# Patient Record
Sex: FEMALE | Race: OTHER
[De-identification: names, ages, dates, MRNs, and addresses within clinical notes are randomized per-mention and may not be internally consistent; named-entity substitution may affect disease eponyms.]

---

## 2017-05-03 ENCOUNTER — HOSPITAL ENCOUNTER (EMERGENCY)
Dept: HOSPITAL 31 - C.ER | Age: 22
Discharge: HOME | End: 2017-05-03
Payer: COMMERCIAL

## 2017-05-03 VITALS
SYSTOLIC BLOOD PRESSURE: 128 MMHG | TEMPERATURE: 98 F | RESPIRATION RATE: 20 BRPM | DIASTOLIC BLOOD PRESSURE: 72 MMHG | HEART RATE: 72 BPM

## 2017-05-03 VITALS — OXYGEN SATURATION: 99 %

## 2017-05-03 DIAGNOSIS — O20.0: Primary | ICD-10-CM

## 2017-05-03 DIAGNOSIS — O23.41: ICD-10-CM

## 2017-05-03 DIAGNOSIS — Z3A.00: ICD-10-CM

## 2017-05-03 LAB
BACTERIA #/AREA URNS HPF: (no result) /[HPF]
BILIRUB UR-MCNC: NEGATIVE MG/DL
GLUCOSE UR STRIP-MCNC: NORMAL MG/DL
KETONES UR STRIP-MCNC: NEGATIVE MG/DL
LEUKOCYTE ESTERASE UR-ACNC: (no result) LEU/UL
PH UR STRIP: 5 [PH] (ref 5–8)
PROT UR STRIP-MCNC: NEGATIVE MG/DL
RBC # UR STRIP: NEGATIVE /UL
RBC #/AREA URNS HPF: 4 /HPF (ref 0–3)
SP GR UR STRIP: 1.03 (ref 1–1.03)
UROBILINOGEN UR-MCNC: NORMAL MG/DL (ref 0.2–1)
WBC #/AREA URNS HPF: 10 /HPF (ref 0–5)

## 2017-05-03 NOTE — C.PDOC
History Of Present Illness





22 y/o female presents to the ED with complains of vaginal spotting yesterday 

and lower abdominal cramping today. Pt pregnant, LMP sometime in April, , 

 at age 16. Denies fever, chills, vomiting, diarrhea or any other 

complaints. 


Time Seen by Provider: 17 18:00


Chief Complaint (Nursing): Female Genitourinary


History Per: Patient


History/Exam Limitations: no limitations


Onset/Duration Of Symptoms: Hrs


Current Symptoms Are (Timing): Still Present


Severity: Mild


Recent travel outside of the [place]: No





Past Medical History


Reviewed: Historical Data, Nursing Documentation, Vital Signs


Vital Signs: 


 Last Vital Signs











Temp  98 F   17 20:


 


Pulse  72   17 20:26


 


Resp  20   17 20:26


 


BP  128/72   17 20:26


 


Pulse Ox  98   17 20:26











Family History: States: Unknown Family Hx





- Social History


Hx Alcohol Use: No


Hx Substance Use: No





Review Of Systems


Except As Marked, All Systems Reviewed And Found Negative.


Constitutional: Negative for: Fever, Chills


Gastrointestinal: Positive for: Abdominal Pain.  Negative for: Nausea, Vomiting


Genitourinary: Positive for: Vaginal Bleeding (spotting)





Physical Exam





- Physical Exam


Appears: Non-toxic, No Acute Distress


Skin: Warm, Dry, No Rash


Head: Atraumatic, Normacephalic


Neck: Normal ROM, Supple


Chest: Symmetrical


Cardiovascular: Rhythm Regular, No Murmur


Respiratory: Normal Breath Sounds, No Rales, No Rhonchi, No Wheezing


Gastrointestinal/Abdominal: Normal Exam, Soft, No Tenderness


Extremity: Bilateral: Atraumatic


Neurological/Psych: Oriented x3, Normal Speech





ED Course And Treatment





- Laboratory Results


Lab Interpretation: Abnormal (BHCG 46.35, Urine with 10 WBC, small bacteria and 

1+leukocyte esterase)


O2 Sat by Pulse Oximetry: 99 (room air)


Pulse Ox Interpretation: Normal





- CT Scan/US


  ** Pelvic US


Other Rad Studies (CT/US): Read By Radiologist, Radiology Report Reviewed


CT/US Interpretation: IMPRESSION:  No intrauterine pregnancy. In light of 

positive hCG, consider serial hCG follow-up and repeat.  ultrasound in one or 2 

weeks for more definitive assessment. 2.5 cm right ovarian cyst.


Progress Note: Plan: UA, beta-HCG, Pelvic US


Reevaluation Time: 20:18


Reassessment Condition: Unchanged (Patient in no distress.)





Disposition


Counseled Patient/Family Regarding: Studies Performed, Diagnosis, Need For 

Followup





- Disposition


Disposition: HOME/ ROUTINE


Disposition Time: 20:20


Condition: STABLE


Additional Instructions: 


Follow up with your gynecologist next week for repeat labs and ultrasound. Take 

the antibiotics they gave you for the urinary tract infection as prescribed. 


Instructions:  Threatened Miscarriage (ED), Urinary Tract Infection in 

Pregnancy (ED)





- Clinical Impression


Clinical Impression: 


 Threatened , UTI (urinary tract infection) in pregnancy in first 

trimester








- Scribe Statement


The provider has reviewed the documentation as recorded by the Gila Turpin


Provider Attestation: 








All medical record entries made by the Gila were at my direction and 

personally dictated by me. I have reviewed the chart and agree that the record 

accurately reflects my personal performance of the history, physical exam, 

medical decision making, and the department course for this patient. I have 

also personally directed, reviewed, and agree with the discharge instructions 

and disposition.

## 2017-05-04 NOTE — US
Pelvic ultrasound 



History: Pregnancy.  Vaginal bleeding. 



Comparison: None available. 



Technique: Real-time sonography was performed through the pelvis 

utilizing transabdominal and transvaginal techniques. 



Findings: 



Uterus: 7.0 x 3.9 x 4.5 centimeters.  Anteverted.  Heterogeneous 

echotexture. 



Endometrium: Thickened measuring up to 1.35 centimeters. 



No discrete intrauterine pregnancy identified. 



Right ovary: 4.5 x 2.4 x 3.6 centimeters. Normal flow. Multiple 

hypoechoic cysts the largest of which measures up to 2.5 centimeters. 



Left ovary: 4.1 x 2.8 x 3.9 centimeters. Normal flow. 



Impression: 



No discrete intrauterine pregnancy identified. In light of the 

positive HCG, HCG follow-up and repeat ultrasound in a 1 or 2 week 

interval for more definitive assessment may be helpful. In the 

setting of a positive pregnancy test and lack of discrete 

intrauterine pregnancy, these findings may include an early 

intrauterine pregnancy versus missed pregnancy versus ectopic 

pregnancy.  Clinical correlation. 



2.5 centimeter right ovarian cyst. 



Thickened endometrium measuring up to 1.35 centimeters. 



These findings were preliminarily reported by Dr Redd Martinez from 

[facility] at 7:55 p.m. on 05/03/2017. 



Limited 1st trimester ultrasound for viability purposes only.  

Continued interval followup with serial ultrasound, serial HCG levels,

 and gynecological consultation would be helpful if clinically 

indicated.

## 2018-04-16 ENCOUNTER — HOSPITAL ENCOUNTER (EMERGENCY)
Dept: HOSPITAL 31 - C.ER | Age: 23
Discharge: HOME | End: 2018-04-16
Payer: COMMERCIAL

## 2018-04-16 VITALS
SYSTOLIC BLOOD PRESSURE: 140 MMHG | HEART RATE: 71 BPM | TEMPERATURE: 98.3 F | OXYGEN SATURATION: 100 % | DIASTOLIC BLOOD PRESSURE: 74 MMHG

## 2018-04-16 VITALS — RESPIRATION RATE: 20 BRPM

## 2018-04-16 DIAGNOSIS — Z48.01: Primary | ICD-10-CM

## 2018-04-16 NOTE — C.PDOC
History Of Present Illness


21 y/o female presents to the ED requesting suture removal. Patient states on 3/

26 she had an abdominoplasty done in [place]. She followed up with her surgeon 

on 4/11 and was told the sutures were not yet ready for removal. Patient then 

had to return to [place] and was unable to return to the surgeon's office. 

She denies any fever, chills, abdominal pain, vomiting, or discharge from the 

incision. 


 (Hortencia Li)


History Per: Patient


History/Exam Limitations: no limitations


Onset/Duration Of Symptoms: Days


Current Symptoms Are (Timing): Still Present


Time Seen by Provider: 04/16/18 23:19


Chief Complaint (Nursing): Abnormal Skin Integrity





Past Medical History


Reviewed: Historical Data, Nursing Documentation, Vital Signs





- Medical History


PMH: No Chronic Diseases


Other Surgeries: Abdominoplasty


Family History: States: Unknown Family Hx





- Social History


Hx Tobacco Use: No


Hx Alcohol Use: No


Hx Substance Use: No





- Immunization History


Hx Tetanus Toxoid Vaccination: No


Hx Influenza Vaccination: No


Hx Pneumococcal Vaccination: No


Vital Signs: 





 Last Vital Signs











Temp  98.3 F   04/16/18 23:13


 


Pulse  71   04/16/18 23:13


 


Resp  20   04/16/18 23:56


 


BP  140/74   04/16/18 23:13


 


Pulse Ox  100   04/17/18 01:14














Review Of Systems


Except As Marked, All Systems Reviewed And Found Negative.


Constitutional: Negative for: Fever, Chills


Gastrointestinal: Positive for: Other (sutures in place to abdominal incision).

  Negative for: Nausea, Vomiting, Abdominal Pain





Physical Exam





- Physical Exam


Appears: Non-toxic, No Acute Distress


Skin: Warm, Dry


Head: Atraumatic, Normacephalic


Eye(s): bilateral: Normal Inspection, EOMI


Nose: Normal


Oral Mucosa: Moist


Chest: Symmetrical


Respiratory: No Accessory Muscle Use


Gastrointestinal/Abdominal: Soft, Other (Healing incision across the abdomen, 2 

Nylon sutures protruding centrally with 1 suture protruding from the right side

; Centrally there is mild erythema -- healing in appearance; there is no 

increased warmth or discharge. Mild swelling noted)


Extremity: Bilateral: Atraumatic, Normal ROM


Neurological/Psych: Oriented x3, Normal Speech





ED Course And Treatment


O2 Sat by Pulse Oximetry: 100 (RA)


Pulse Ox Interpretation: Normal


Progress Note: Patient is AAOx3, afebrile, in no acute distress on examination.

  Case discussed with Dr. Tovar, who recommends that patient follow up with 

surgery.  Patient will be discharged home with cephalexin prescription. Advised 

to follow up with surgery, referral provided. Patient instructed to return to 

the ED for any fever or worsening symptoms.





Disposition


Counseled Patient/Family Regarding: Diagnosis, Need For Followup





- Disposition


Disposition Time: 23:41





- Disposition


Referrals: 


Jarocho Barrios MD [Staff Provider] - 


Disposition: HOME/ ROUTINE


Condition: STABLE


Additional Instructions: 


Follow up with surgeon in 1-2 days. Return to ER if symptoms persist or worsen. 


Prescriptions: 


Cephalexin [cephalexin] 500 mg PO BID 7 Days  cap


Instructions:  Wound Care (DC)


Forms:  [facility] (English)





- Clinical Impression


Clinical Impression: 


 Visit for wound check








- PA / NP / Resident Statement


MD/DO has reviewed & agrees with the documentation as recorded.





- Scribe Statement


The provider has reviewed the documentation as recorded by the Scribe (Melany Chauhan)





- Scribe Statement





All medical record entries made by the Scribe were at my direction and 

personally dictated by me. I have reviewed the chart and agree that the record 

accurately reflects my personal performance of the history, physical exam, 

medical decision making, and the department course for this patient. I have 

also personally directed, reviewed, and agree with the discharge instructions 

and disposition. (Hortencia Li)